# Patient Record
Sex: MALE | Race: OTHER | URBAN - METROPOLITAN AREA
[De-identification: names, ages, dates, MRNs, and addresses within clinical notes are randomized per-mention and may not be internally consistent; named-entity substitution may affect disease eponyms.]

---

## 2017-02-15 ENCOUNTER — OUTPATIENT (OUTPATIENT)
Dept: OUTPATIENT SERVICES | Facility: HOSPITAL | Age: 62
LOS: 1 days | Discharge: HOME | End: 2017-02-15

## 2017-06-27 DIAGNOSIS — E11.9 TYPE 2 DIABETES MELLITUS WITHOUT COMPLICATIONS: ICD-10-CM

## 2017-06-27 DIAGNOSIS — E78.00 PURE HYPERCHOLESTEROLEMIA, UNSPECIFIED: ICD-10-CM

## 2017-11-02 ENCOUNTER — OUTPATIENT (OUTPATIENT)
Dept: OUTPATIENT SERVICES | Facility: HOSPITAL | Age: 62
LOS: 1 days | Discharge: HOME | End: 2017-11-02

## 2017-11-02 DIAGNOSIS — E11.9 TYPE 2 DIABETES MELLITUS WITHOUT COMPLICATIONS: ICD-10-CM

## 2018-02-22 ENCOUNTER — OUTPATIENT (OUTPATIENT)
Dept: OUTPATIENT SERVICES | Facility: HOSPITAL | Age: 63
LOS: 1 days | Discharge: HOME | End: 2018-02-22

## 2018-02-23 DIAGNOSIS — M10.9 GOUT, UNSPECIFIED: ICD-10-CM

## 2018-02-23 DIAGNOSIS — E11.9 TYPE 2 DIABETES MELLITUS WITHOUT COMPLICATIONS: ICD-10-CM

## 2020-11-16 ENCOUNTER — APPOINTMENT (OUTPATIENT)
Dept: CARDIOLOGY | Facility: CLINIC | Age: 65
End: 2020-11-16
Payer: MEDICARE

## 2020-11-16 VITALS
HEIGHT: 73 IN | WEIGHT: 200 LBS | BODY MASS INDEX: 26.51 KG/M2 | DIASTOLIC BLOOD PRESSURE: 80 MMHG | SYSTOLIC BLOOD PRESSURE: 132 MMHG

## 2020-11-16 DIAGNOSIS — R06.02 SHORTNESS OF BREATH: ICD-10-CM

## 2020-11-16 DIAGNOSIS — Z78.9 OTHER SPECIFIED HEALTH STATUS: ICD-10-CM

## 2020-11-16 PROCEDURE — 93000 ELECTROCARDIOGRAM COMPLETE: CPT

## 2020-11-16 PROCEDURE — 99214 OFFICE O/P EST MOD 30 MIN: CPT

## 2020-11-16 RX ORDER — METOPROLOL TARTRATE 50 MG/1
50 TABLET, FILM COATED ORAL
Refills: 0 | Status: ACTIVE | COMMUNITY

## 2020-11-16 RX ORDER — ESCITALOPRAM OXALATE 10 MG/1
10 TABLET ORAL DAILY
Refills: 0 | Status: ACTIVE | COMMUNITY

## 2020-11-16 RX ORDER — NITROGLYCERIN 0.4 MG/1
0.4 TABLET SUBLINGUAL
Refills: 0 | Status: ACTIVE | COMMUNITY

## 2020-11-16 RX ORDER — ISOSORBIDE MONONITRATE 30 MG/1
30 TABLET, EXTENDED RELEASE ORAL DAILY
Refills: 0 | Status: ACTIVE | COMMUNITY

## 2020-11-16 RX ORDER — MELOXICAM 7.5 MG/1
7.5 TABLET ORAL DAILY
Refills: 0 | Status: ACTIVE | COMMUNITY

## 2020-11-16 RX ORDER — ALLOPURINOL 100 MG/1
100 TABLET ORAL DAILY
Refills: 0 | Status: ACTIVE | COMMUNITY

## 2020-11-16 RX ORDER — RANOLAZINE 500 MG/1
500 TABLET, EXTENDED RELEASE ORAL
Refills: 0 | Status: ACTIVE | COMMUNITY

## 2020-11-16 RX ORDER — TRAZODONE HYDROCHLORIDE 50 MG/1
50 TABLET ORAL
Refills: 0 | Status: ACTIVE | COMMUNITY

## 2020-11-16 NOTE — HISTORY OF PRESENT ILLNESS
[FreeTextEntry1] : Patient is a 65-yo male who had an AWMI in [facility] 10 years ago. [facility] was not available there at the time, medical therapy was given. Patient was told that he had a transmural scar and very weak heart. He did feel very SOB with minimal exertion after discharge from the hospital but gradually was able to increase his daily walking. He has generally been stable for almost 10 years, with only occasional episodes of chest tightness with emotional stress and unusual exertion, quickly relieved by SL nitroglycerin, improved since starting Ranexa. Exercise stress ECHO in 2014 showed apical scar, EF 45-50%, no ischemia.\par \par   Patient does c/o fatigue and ARRIAZA.

## 2020-11-16 NOTE — REVIEW OF SYSTEMS
[Blurry Vision] : no blurred vision [Seeing Double (Diplopia)] : no diplopia [see HPI] : see HPI [Lower Ext Edema] : no extremity edema [Leg Claudication] : no intermittent leg claudication [Palpitations] : no palpitations [Skin: A Rash] : no rash: [Anxiety] : no anxiety [Easy Bleeding] : no tendency for easy bleeding [Easy Bruising] : no tendency for easy bruising [Negative] : Endocrine

## 2020-11-16 NOTE — ASSESSMENT
[FreeTextEntry1] : CAD, s/p old ALMI. Mild LV dysfunction in 2016.\par HTN, DM, hyperlipidemia. \par ARRIAZA.\par \par Plan:\par Continue treatment.\par 2D ECHO.\par Patient encouraged to take daily walks.\par Repeat BW.\par \par \par Jesús Chilel MD\par

## 2020-11-16 NOTE — PHYSICAL EXAM
[General Appearance - Well Developed] : well developed [Normal Appearance] : normal appearance [Well Groomed] : well groomed [General Appearance - Well Nourished] : well nourished [No Deformities] : no deformities [General Appearance - In No Acute Distress] : no acute distress [Normal Conjunctiva] : the conjunctiva exhibited no abnormalities [Eyelids - No Xanthelasma] : the eyelids demonstrated no xanthelasmas [Heart Rate And Rhythm] : heart rate and rhythm were normal [Heart Sounds] : normal S1 and S2 [Murmurs] : no murmurs present [Arterial Pulses Normal] : the arterial pulses were normal [Edema] : no peripheral edema present [Respiration, Rhythm And Depth] : normal respiratory rhythm and effort [Exaggerated Use Of Accessory Muscles For Inspiration] : no accessory muscle use [Auscultation Breath Sounds / Voice Sounds] : lungs were clear to auscultation bilaterally [Bowel Sounds] : normal bowel sounds [Abdomen Soft] : soft [Abdomen Tenderness] : non-tender [Abdomen Mass (___ Cm)] : no abdominal mass palpated [Cyanosis, Localized] : no localized cyanosis [Skin Color & Pigmentation] : normal skin color and pigmentation [] : no rash [Oriented To Time, Place, And Person] : oriented to person, place, and time

## 2020-12-12 ENCOUNTER — APPOINTMENT (OUTPATIENT)
Dept: CARDIOLOGY | Facility: CLINIC | Age: 65
End: 2020-12-12
Payer: MEDICARE

## 2020-12-12 PROCEDURE — 93306 TTE W/DOPPLER COMPLETE: CPT

## 2021-01-07 ENCOUNTER — APPOINTMENT (OUTPATIENT)
Dept: CARDIOLOGY | Facility: CLINIC | Age: 66
End: 2021-01-07
Payer: MEDICARE

## 2021-01-07 VITALS
SYSTOLIC BLOOD PRESSURE: 126 MMHG | WEIGHT: 202 LBS | DIASTOLIC BLOOD PRESSURE: 88 MMHG | HEIGHT: 71 IN | BODY MASS INDEX: 28.28 KG/M2

## 2021-01-07 PROCEDURE — 99213 OFFICE O/P EST LOW 20 MIN: CPT

## 2021-01-07 PROCEDURE — 93000 ELECTROCARDIOGRAM COMPLETE: CPT

## 2021-01-07 NOTE — ASSESSMENT
[FreeTextEntry1] : CAD, s/p AWMI (no intervention performed).\par Apical scar, LVEF 49%.\par Good functional capacity. Patient is not interested in ischemia w/u unless his symptoms change.\par Hyperlipidemia still uncontrolled.\par \par Plan:\par Continue Atorvastatin, add Fenofibrate.\par Continue daily walking.\par Repeat BW prior to next visit.\par F/u in 6 months.\par \par Jesús Chilel MD\par \par \par

## 2021-01-07 NOTE — HISTORY OF PRESENT ILLNESS
[FreeTextEntry1] : Patient is a 65-yo male who had an AWMI in [facility] > 10 years ago. [facility] was not available there at the time, medical therapy was given. Patient was told that he had a transmural scar and very weak heart. He did feel very SOB with minimal exertion after discharge from the hospital but gradually was able to increase his daily walking. He has generally been stable for > 10 years, with only occasional episodes of chest tightness with emotional stress and unusual exertion, quickly relieved by SL nitroglycerin, improved since starting Ranexa. Exercise stress ECHO in 2014 showed apical scar, EF 45-50%, no ischemia.\par \par   Patient does c/o fatigue, denies SOB or any recent episodes of chest pain.\par \par 2D ECHO 12/12/20:\par Apical WMAs, LVEF 49%\par Mild MR, TR\par Aortic root 3.9 cm.\par \par GFR 55\par Hb A1c 6.7\par LDL 99\par HDL 39\par Triglycerides 201\par TSH 0.94.\par

## 2021-01-07 NOTE — PHYSICAL EXAM
[General Appearance - Well Developed] : well developed [Normal Appearance] : normal appearance [Well Groomed] : well groomed [General Appearance - Well Nourished] : well nourished [No Deformities] : no deformities [General Appearance - In No Acute Distress] : no acute distress [Normal Conjunctiva] : the conjunctiva exhibited no abnormalities [Eyelids - No Xanthelasma] : the eyelids demonstrated no xanthelasmas [Respiration, Rhythm And Depth] : normal respiratory rhythm and effort [Exaggerated Use Of Accessory Muscles For Inspiration] : no accessory muscle use [Auscultation Breath Sounds / Voice Sounds] : lungs were clear to auscultation bilaterally [Heart Rate And Rhythm] : heart rate and rhythm were normal [Heart Sounds] : normal S1 and S2 [Murmurs] : no murmurs present [Arterial Pulses Normal] : the arterial pulses were normal [Edema] : no peripheral edema present [Bowel Sounds] : normal bowel sounds [Abdomen Soft] : soft [Abdomen Tenderness] : non-tender [Abdomen Mass (___ Cm)] : no abdominal mass palpated [Cyanosis, Localized] : no localized cyanosis [Skin Color & Pigmentation] : normal skin color and pigmentation [] : no rash [Oriented To Time, Place, And Person] : oriented to person, place, and time

## 2021-05-07 ENCOUNTER — RESULT CHARGE (OUTPATIENT)
Age: 66
End: 2021-05-07

## 2021-05-07 ENCOUNTER — APPOINTMENT (OUTPATIENT)
Dept: CARDIOLOGY | Facility: CLINIC | Age: 66
End: 2021-05-07
Payer: MEDICARE

## 2021-05-07 VITALS
HEART RATE: 68 BPM | BODY MASS INDEX: 28.84 KG/M2 | WEIGHT: 206 LBS | SYSTOLIC BLOOD PRESSURE: 135 MMHG | HEIGHT: 71 IN | DIASTOLIC BLOOD PRESSURE: 85 MMHG | TEMPERATURE: 97.8 F

## 2021-05-07 PROCEDURE — 93000 ELECTROCARDIOGRAM COMPLETE: CPT

## 2021-05-07 PROCEDURE — 99214 OFFICE O/P EST MOD 30 MIN: CPT

## 2021-05-10 RX ORDER — OLMESARTAN MEDOXOMIL AND HYDROCHLOROTHIAZIDE 20; 12.5 MG/1; MG/1
20-12.5 TABLET ORAL DAILY
Refills: 0 | Status: DISCONTINUED | COMMUNITY
End: 2021-05-10

## 2021-05-10 NOTE — REVIEW OF SYSTEMS
[Lower Ext Edema] : no extremity edema [Leg Claudication] : no intermittent leg claudication [Palpitations] : no palpitations [Orthopnea] : no orthopnea [Syncope] : no syncope [Rash] : no rash [Anxiety] : no anxiety [Easy Bleeding] : no tendency for easy bleeding [Easy Bruising] : no tendency for easy bruising [Negative] : Neurological

## 2021-05-10 NOTE — HISTORY OF PRESENT ILLNESS
[FreeTextEntry1] : Patient is a 65-yo male who had an AWMI in [facility] > 10 years ago. [facility] was not available there at the time, medical therapy was given. Patient was told that he had a transmural scar and very weak heart. He did feel very SOB with minimal exertion after discharge from the hospital but gradually was able to increase his daily walking. He has generally been stable for > 10 years, with only occasional episodes of chest tightness with emotional stress and unusual exertion, quickly relieved by SL nitroglycerin, improved since starting Ranexa. Exercise stress ECHO in 2014 showed apical scar, EF 45-50%, no ischemia.\par \par   Patient does c/o fatigue, denies SOB or any recent episodes of chest pain. His BP has been elevated.\par \par 2D ECHO 12/12/20:\par Apical WMAs, LVEF 49%\par Mild MR, TR\par Aortic root 3.9 cm.\par \par \par

## 2021-05-10 NOTE — ASSESSMENT
[FreeTextEntry1] : CAD, s/p AWMI (no intervention performed).\par Apical scar, LVEF 49%.\par Good functional capacity. Patient is not interested in [facility] unless his symptoms change.\par Hyperlipidemia.\par HTN uncontrolled.\par \par Plan:\par Continue Atorvastatin, Fenofibrate.Blood work pending.\par Continue Losartan 100 mg, add HCTZ 12.5 mg.\par Continue daily walking.\par F/u in 4 months.\par \par Jesús Chilel MD\par \par \par

## 2021-07-11 ENCOUNTER — RX RENEWAL (OUTPATIENT)
Age: 66
End: 2021-07-11

## 2021-09-17 ENCOUNTER — APPOINTMENT (OUTPATIENT)
Dept: CARDIOLOGY | Facility: CLINIC | Age: 66
End: 2021-09-17
Payer: MEDICARE

## 2021-09-17 VITALS
SYSTOLIC BLOOD PRESSURE: 120 MMHG | HEIGHT: 71 IN | WEIGHT: 203 LBS | DIASTOLIC BLOOD PRESSURE: 80 MMHG | BODY MASS INDEX: 28.42 KG/M2

## 2021-09-17 PROCEDURE — 99213 OFFICE O/P EST LOW 20 MIN: CPT

## 2021-09-17 PROCEDURE — 93000 ELECTROCARDIOGRAM COMPLETE: CPT

## 2021-09-19 RX ORDER — GENTAMICIN SULFATE 1 MG/G
0.1 OINTMENT TOPICAL
Qty: 30 | Refills: 0 | Status: DISCONTINUED | COMMUNITY
Start: 2021-09-12

## 2021-09-19 RX ORDER — BLOOD SUGAR DIAGNOSTIC
STRIP MISCELLANEOUS
Qty: 100 | Refills: 0 | Status: DISCONTINUED | COMMUNITY
Start: 2021-08-03

## 2021-09-19 RX ORDER — OLMESARTAN MEDOXOMIL 20 MG/1
20 TABLET, FILM COATED ORAL
Qty: 30 | Refills: 0 | Status: DISCONTINUED | COMMUNITY
Start: 2021-04-13

## 2021-09-19 RX ORDER — BUSPIRONE HYDROCHLORIDE 15 MG/1
15 TABLET ORAL
Qty: 60 | Refills: 0 | Status: ACTIVE | COMMUNITY
Start: 2021-05-18

## 2021-09-19 RX ORDER — LIRAGLUTIDE 6 MG/ML
18 INJECTION SUBCUTANEOUS
Qty: 9 | Refills: 0 | Status: DISCONTINUED | COMMUNITY
Start: 2021-07-06

## 2021-09-19 RX ORDER — FAMOTIDINE 40 MG/1
40 TABLET, FILM COATED ORAL
Qty: 30 | Refills: 0 | Status: ACTIVE | COMMUNITY
Start: 2021-08-03

## 2021-09-19 RX ORDER — PEN NEEDLE, DIABETIC 32GX 5/32"
70 NEEDLE, DISPOSABLE MISCELLANEOUS
Qty: 100 | Refills: 0 | Status: DISCONTINUED | COMMUNITY
Start: 2021-08-03

## 2021-09-19 NOTE — ASSESSMENT
[FreeTextEntry1] : CAD, s/p AWMI (no intervention performed).\par Apical scar, LVEF 49%.\par Good functional capacity. Patient is not interested in [facility] unless his symptoms change.\par Hyperlipidemia.\par HTN.\par \par Plan:\par Continue treatment.\par Continue daily walking.\par Repeat fasting blood work.\par F/u in 6 months.\par \par Jesús Chilel MD\par \par \par

## 2021-09-19 NOTE — HISTORY OF PRESENT ILLNESS
[FreeTextEntry1] : Patient is a 65-yo male who had an AWMI in [facility] > 10 years ago. [facility] was not available there at the time, medical therapy was given. Patient was told that he had a transmural scar and very weak heart. He did feel very SOB with minimal exertion after discharge from the hospital but gradually was able to increase his daily walking. He has generally been stable for > 10 years, with only occasional episodes of chest tightness with emotional stress and unusual exertion, quickly relieved by SL nitroglycerin, improved since starting Ranexa. Exercise stress ECHO in 2014 showed apical scar, EF 45-50%, no ischemia.\par \par   Patient denies SOB or any recent episodes of chest pain. His BP has been well controlled with 1/2 tablet of Losartan HCT.\par \par 2D ECHO 12/12/20:\par Apical WMAs, LVEF 49%\par Mild MR, TR\par Aortic root 3.9 cm.\par \par LDL 89\par GFR 51.

## 2021-09-20 ENCOUNTER — RESULT CHARGE (OUTPATIENT)
Age: 66
End: 2021-09-20

## 2022-01-07 ENCOUNTER — RX RENEWAL (OUTPATIENT)
Age: 67
End: 2022-01-07

## 2022-03-04 ENCOUNTER — RESULT CHARGE (OUTPATIENT)
Age: 67
End: 2022-03-04

## 2022-03-04 ENCOUNTER — APPOINTMENT (OUTPATIENT)
Dept: CARDIOLOGY | Facility: CLINIC | Age: 67
End: 2022-03-04
Payer: MEDICARE

## 2022-03-04 VITALS
HEART RATE: 65 BPM | DIASTOLIC BLOOD PRESSURE: 76 MMHG | WEIGHT: 199 LBS | HEIGHT: 71 IN | SYSTOLIC BLOOD PRESSURE: 122 MMHG | BODY MASS INDEX: 27.86 KG/M2

## 2022-03-04 PROCEDURE — 99213 OFFICE O/P EST LOW 20 MIN: CPT

## 2022-03-04 PROCEDURE — 93000 ELECTROCARDIOGRAM COMPLETE: CPT

## 2022-03-04 RX ORDER — EMPAGLIFLOZIN 10 MG/1
10 TABLET, FILM COATED ORAL DAILY
Refills: 0 | Status: ACTIVE | COMMUNITY

## 2022-03-04 RX ORDER — METFORMIN HYDROCHLORIDE 500 MG/1
500 TABLET, FILM COATED, EXTENDED RELEASE ORAL
Qty: 60 | Refills: 0 | Status: DISCONTINUED | COMMUNITY
Start: 2021-07-06 | End: 2022-03-04

## 2022-03-04 RX ORDER — FLUTICASONE PROPIONATE 50 UG/1
50 SPRAY, METERED NASAL DAILY
Qty: 2 | Refills: 3 | Status: ACTIVE | COMMUNITY

## 2022-03-04 RX ORDER — DICLOFENAC EPOLAMINE 0.01 G/1
1.3 SYSTEM TOPICAL ONCE
Refills: 0 | Status: ACTIVE | COMMUNITY

## 2022-03-04 NOTE — PHYSICAL EXAM
[Well Developed] : well developed [Well Nourished] : well nourished [No Acute Distress] : no acute distress [Normal Conjunctiva] : normal conjunctiva [No Carotid Bruit] : no carotid bruit [Normal Venous Pressure] : normal venous pressure [Normal S1, S2] : normal S1, S2 [No Murmur] : no murmur [No Rub] : no rub [S4] : S4 [Clear Lung Fields] : clear lung fields [Good Air Entry] : good air entry [No Respiratory Distress] : no respiratory distress  [Soft] : abdomen soft [Non Tender] : non-tender [Normal Bowel Sounds] : normal bowel sounds [Normal Gait] : normal gait [No Edema] : no edema [No Cyanosis] : no cyanosis [No Clubbing] : no clubbing [No Varicosities] : no varicosities [No Rash] : no rash [Moves all extremities] : moves all extremities [No Focal Deficits] : no focal deficits [Normal Speech] : normal speech [Normal memory] : normal memory [Alert and Oriented] : alert and oriented

## 2022-03-05 RX ORDER — LOSARTAN POTASSIUM AND HYDROCHLOROTHIAZIDE 12.5; 1 MG/1; MG/1
100-12.5 TABLET ORAL DAILY
Qty: 90 | Refills: 1 | Status: DISCONTINUED | COMMUNITY
Start: 2021-05-07 | End: 2022-03-05

## 2022-03-05 NOTE — REVIEW OF SYSTEMS
[Negative] : Neurological [Lower Ext Edema] : no extremity edema [Leg Claudication] : no intermittent leg claudication [Orthopnea] : no orthopnea [Palpitations] : no palpitations [Syncope] : no syncope [Rash] : no rash [Anxiety] : no anxiety [Easy Bruising] : no tendency for easy bruising [Easy Bleeding] : no tendency for easy bleeding

## 2022-03-05 NOTE — HISTORY OF PRESENT ILLNESS
[FreeTextEntry1] : Patient is a 65-yo male who had an AWMI in [facility] > 10 years ago. [facility] was not available there at the time, medical therapy was given. Patient was told that he had a transmural scar and very weak heart. He did feel very SOB with minimal exertion after discharge from the hospital but gradually was able to increase his daily walking. He has generally been stable for > 10 years, with only occasional episodes of chest tightness with emotional stress and unusual exertion, quickly relieved by SL nitroglycerin, improved since starting Ranexa. Exercise stress ECHO in 2014 showed apical scar, EF 45-50%, no ischemia.\par \par   Patient denies SOB or any recent episodes of chest pain. His BP has been well controlled with 1/2 tablet of Losartan HCT.\par \par 2D ECHO 12/12/20:\par Apical WMAs, LVEF 49%\par Mild MR, TR\par Aortic root 3.9 cm.\par \par GFR 43\par HbA1c 7.0.

## 2022-03-05 NOTE — ASSESSMENT
[FreeTextEntry1] : CAD, s/p AWMI (no intervention performed).\par Apical scar, LVEF 49%.\par Good functional capacity. Patient is not interested in [facility] unless his symptoms change.\par Hyperlipidemia.\par CKD 3, HCTZ stopped.\par HTN still controlled.\par \par Plan:\par Continue treatment.\par Continue daily walking.\par Repeat fasting blood work with lipid profile. \par 2D ECHO.\par F/u in 6 months.\par \par Jesús Chilel MD\par \par \par

## 2022-05-04 ENCOUNTER — APPOINTMENT (OUTPATIENT)
Dept: CARDIOLOGY | Facility: CLINIC | Age: 67
End: 2022-05-04

## 2022-05-04 PROCEDURE — 93306 TTE W/DOPPLER COMPLETE: CPT

## 2022-06-03 ENCOUNTER — OUTPATIENT (OUTPATIENT)
Dept: OUTPATIENT SERVICES | Facility: HOSPITAL | Age: 67
LOS: 1 days | Discharge: HOME | End: 2022-06-03

## 2022-08-04 ENCOUNTER — OUTPATIENT (OUTPATIENT)
Dept: OUTPATIENT SERVICES | Facility: HOSPITAL | Age: 67
LOS: 1 days | Discharge: HOME | End: 2022-08-04

## 2022-09-06 ENCOUNTER — RESULT CHARGE (OUTPATIENT)
Age: 67
End: 2022-09-06

## 2022-09-06 ENCOUNTER — APPOINTMENT (OUTPATIENT)
Dept: CARDIOLOGY | Facility: CLINIC | Age: 67
End: 2022-09-06

## 2022-09-06 VITALS
SYSTOLIC BLOOD PRESSURE: 124 MMHG | HEIGHT: 71 IN | DIASTOLIC BLOOD PRESSURE: 84 MMHG | BODY MASS INDEX: 27.02 KG/M2 | WEIGHT: 193 LBS | HEART RATE: 71 BPM

## 2022-09-06 PROCEDURE — 99213 OFFICE O/P EST LOW 20 MIN: CPT

## 2022-09-06 PROCEDURE — 93000 ELECTROCARDIOGRAM COMPLETE: CPT

## 2022-09-06 RX ORDER — BLOOD-GLUCOSE METER
32G X 4 MM EACH MISCELLANEOUS
Qty: 100 | Refills: 0 | Status: DISCONTINUED | COMMUNITY
Start: 2022-04-11

## 2022-09-06 RX ORDER — METFORMIN HYDROCHLORIDE 500 MG/1
500 TABLET, COATED ORAL DAILY
Refills: 0 | Status: DISCONTINUED | COMMUNITY
End: 2022-09-06

## 2022-09-06 RX ORDER — IBUPROFEN 600 MG/1
600 TABLET, FILM COATED ORAL
Qty: 10 | Refills: 0 | Status: DISCONTINUED | COMMUNITY
Start: 2022-08-27

## 2022-09-06 RX ORDER — LOSARTAN POTASSIUM 100 MG/1
100 TABLET, FILM COATED ORAL
Qty: 90 | Refills: 1 | Status: DISCONTINUED | COMMUNITY
Start: 2022-03-05 | End: 2022-09-06

## 2022-09-06 RX ORDER — CHLORHEXIDINE GLUCONATE, 0.12% ORAL RINSE 1.2 MG/ML
0.12 SOLUTION DENTAL
Qty: 473 | Refills: 0 | Status: DISCONTINUED | COMMUNITY
Start: 2022-08-27

## 2022-09-06 RX ORDER — AMOXICILLIN 500 MG/1
500 CAPSULE ORAL
Qty: 21 | Refills: 0 | Status: DISCONTINUED | COMMUNITY
Start: 2022-08-27

## 2022-09-06 NOTE — ASSESSMENT
[FreeTextEntry1] : CAD, s/p AWMI (no intervention performed).\par Apical scar, LVEF 56%.\par Good functional capacity. Patient is not interested in [facility] unless his symptoms change.\par Hyperlipidemia.\par CKD 3.\par HTN still controlled.\par \par Plan:\par Continue treatment.\par Continue daily walking.\par Repeat fasting blood work with lipid profile. \par F/u in 6 months.\par \par Jesús Chilel MD\par \par \par  no

## 2022-09-06 NOTE — HISTORY OF PRESENT ILLNESS
[FreeTextEntry1] : Patient is a 65-yo male who had an AWMI in [facility] > 10 years ago. [facility] was not available there at the time, medical therapy was given. Patient was told that he had a transmural scar and very weak heart. He did feel very SOB with minimal exertion after discharge from the hospital but gradually was able to increase his daily walking. He has generally been stable for > 10 years, with only occasional episodes of chest tightness with emotional stress and unusual exertion, quickly relieved by SL nitroglycerin, improved since starting Ranexa. Exercise stress ECHO in 2014 showed apical scar, EF 45-50%, no ischemia.\par \par   Patient denies SOB or any recent episodes of chest pain. His BP has been well controlled with 1/2 tablet of Losartan HCT.\par \par 2D ECHO 05/04/22:\par Apical WMAs, LVEF 56%, G1DD\par Mild MR\par Aortic root 4.0 cm.\par \par GFR 41\par HbA1c 6.8.

## 2023-03-14 ENCOUNTER — APPOINTMENT (OUTPATIENT)
Dept: CARDIOLOGY | Facility: CLINIC | Age: 68
End: 2023-03-14
Payer: MEDICARE

## 2023-03-14 ENCOUNTER — RESULT CHARGE (OUTPATIENT)
Age: 68
End: 2023-03-14

## 2023-03-14 VITALS
TEMPERATURE: 97.8 F | RESPIRATION RATE: 16 BRPM | HEART RATE: 73 BPM | BODY MASS INDEX: 27.44 KG/M2 | DIASTOLIC BLOOD PRESSURE: 82 MMHG | SYSTOLIC BLOOD PRESSURE: 112 MMHG | WEIGHT: 196 LBS | HEIGHT: 71 IN

## 2023-03-14 PROCEDURE — 93000 ELECTROCARDIOGRAM COMPLETE: CPT

## 2023-03-14 PROCEDURE — 99214 OFFICE O/P EST MOD 30 MIN: CPT

## 2023-03-14 RX ORDER — LIRAGLUTIDE 6 MG/ML
18 INJECTION SUBCUTANEOUS DAILY
Refills: 0 | Status: ACTIVE | COMMUNITY

## 2023-03-14 NOTE — HISTORY OF PRESENT ILLNESS
[FreeTextEntry1] : Patient is a 67-yo male who had an AWMI in [facility] > 10 years ago. [facility] was not available there at the time, medical therapy was given. Patient was told that he had a transmural scar and very weak heart. He did feel very SOB with minimal exertion after discharge from the hospital but gradually was able to increase his daily walking. He has generally been stable for > 10 years, with only occasional episodes of chest tightness with emotional stress and unusual exertion, quickly relieved by SL nitroglycerin, improved since starting Ranexa. Exercise stress ECHO in 2014 showed apical scar, EF 45-50%, no ischemia.\par \par   Patient presents for a follow up visit. He reports feeling better, he denies chest pain, has ARRIAZA when he walks fast but this is not new. \par \par  (68)\par Trig 577 (309)\par GFR 51\par HbA1c 7.5\par \par Stopped Fenofibrate for 3 months as per PCP due to CKD, now restarted it.

## 2023-03-14 NOTE — CARDIOLOGY SUMMARY
[de-identified] : 3/14/23:\par SR, poor R wave progression. [de-identified] : 2D ECHO 05/04/22:\par Apical WMAs, LVEF 56%, G1DD\par Mild MR\par Aortic root 4.0 cm.

## 2023-03-14 NOTE — ASSESSMENT
[FreeTextEntry1] : CAD, s/p AWMI (no intervention performed).\par Apical scar, LVEF 56%.\par Good functional capacity. Patient is not interested in [facility] unless his symptoms change.\par Hyperlipidemia uncontrolled.\par CKD 3.\par HTN still controlled.\par \par Plan:\par Continue treatment. Importance of compliance discussed.\par Add Vascepa.\par Continue daily walking.\par Repeat fasting blood work.\par F/u in 6 months.\par \par Jesús Chilel MD\par \par \par

## 2023-07-05 NOTE — CARDIOLOGY SUMMARY
Problem: Adult Inpatient Plan of Care  Goal: Plan of Care Review  Outcome: Ongoing, Progressing  Goal: Patient-Specific Goal (Individualized)  Outcome: Ongoing, Progressing  Goal: Absence of Hospital-Acquired Illness or Injury  Outcome: Ongoing, Progressing  Goal: Optimal Comfort and Wellbeing  Outcome: Ongoing, Progressing  Goal: Readiness for Transition of Care  Outcome: Ongoing, Progressing     Problem: Skin Injury Risk Increased  Goal: Skin Health and Integrity  Outcome: Ongoing, Progressing     Problem: Impaired Wound Healing  Goal: Optimal Wound Healing  Outcome: Ongoing, Progressing      [de-identified] : 05/07/21:\par SR, old AWMI.

## 2023-07-14 ENCOUNTER — OUTPATIENT (OUTPATIENT)
Dept: OUTPATIENT SERVICES | Facility: HOSPITAL | Age: 68
LOS: 1 days | End: 2023-07-14
Payer: COMMERCIAL

## 2023-07-14 DIAGNOSIS — K02.52 DENTAL CARIES ON PIT AND FISSURE SURFACE PENETRATING INTO DENTIN: ICD-10-CM

## 2023-07-14 PROCEDURE — D0220: CPT

## 2023-07-21 ENCOUNTER — OUTPATIENT (OUTPATIENT)
Dept: OUTPATIENT SERVICES | Facility: HOSPITAL | Age: 68
LOS: 1 days | End: 2023-07-21

## 2023-07-21 DIAGNOSIS — K02.52 DENTAL CARIES ON PIT AND FISSURE SURFACE PENETRATING INTO DENTIN: ICD-10-CM

## 2023-07-21 PROCEDURE — D2331: CPT

## 2023-07-21 PROCEDURE — D0170: CPT

## 2023-07-24 ENCOUNTER — OUTPATIENT (OUTPATIENT)
Dept: OUTPATIENT SERVICES | Facility: HOSPITAL | Age: 68
LOS: 1 days | End: 2023-07-24
Payer: COMMERCIAL

## 2023-07-24 DIAGNOSIS — K02.9 DENTAL CARIES, UNSPECIFIED: ICD-10-CM

## 2023-07-24 PROCEDURE — D0140: CPT

## 2023-07-27 DIAGNOSIS — K02.9 DENTAL CARIES, UNSPECIFIED: ICD-10-CM

## 2023-07-28 ENCOUNTER — OUTPATIENT (OUTPATIENT)
Dept: OUTPATIENT SERVICES | Facility: HOSPITAL | Age: 68
LOS: 1 days | End: 2023-07-28
Payer: COMMERCIAL

## 2023-07-28 DIAGNOSIS — K02.52 DENTAL CARIES ON PIT AND FISSURE SURFACE PENETRATING INTO DENTIN: ICD-10-CM

## 2023-07-28 PROCEDURE — D0220: CPT

## 2023-07-28 PROCEDURE — D0170: CPT

## 2023-07-31 DIAGNOSIS — K02.9 DENTAL CARIES, UNSPECIFIED: ICD-10-CM

## 2023-08-01 DIAGNOSIS — K08.539 FRACTURED DENTAL RESTORATIVE MATERIAL, UNSPECIFIED: ICD-10-CM

## 2023-08-02 DIAGNOSIS — K08.539 FRACTURED DENTAL RESTORATIVE MATERIAL, UNSPECIFIED: ICD-10-CM

## 2023-09-08 LAB
ALBUMIN SERPL ELPH-MCNC: 4.7 G/DL
ALP BLD-CCNC: 32 U/L
ALT SERPL-CCNC: 30 U/L
ANION GAP SERPL CALC-SCNC: 13 MMOL/L
AST SERPL-CCNC: 25 U/L
BILIRUB SERPL-MCNC: 1 MG/DL
BUN SERPL-MCNC: 23 MG/DL
CALCIUM SERPL-MCNC: 9.9 MG/DL
CHLORIDE SERPL-SCNC: 100 MMOL/L
CHOLEST SERPL-MCNC: 193 MG/DL
CO2 SERPL-SCNC: 23 MMOL/L
CREAT SERPL-MCNC: 1.7 MG/DL
EGFR: 44 ML/MIN/1.73M2
ESTIMATED AVERAGE GLUCOSE: 183 MG/DL
GLUCOSE SERPL-MCNC: 137 MG/DL
HBA1C MFR BLD HPLC: 8 %
HDLC SERPL-MCNC: 40 MG/DL
LDLC SERPL CALC-MCNC: 98 MG/DL
NONHDLC SERPL-MCNC: 153 MG/DL
POTASSIUM SERPL-SCNC: 4.7 MMOL/L
PROT SERPL-MCNC: 7.4 G/DL
SODIUM SERPL-SCNC: 136 MMOL/L
TRIGL SERPL-MCNC: 274 MG/DL
TSH SERPL-ACNC: 1.84 UIU/ML

## 2023-09-12 ENCOUNTER — APPOINTMENT (OUTPATIENT)
Dept: CARDIOLOGY | Facility: CLINIC | Age: 68
End: 2023-09-12
Payer: MEDICARE

## 2023-09-12 VITALS
HEART RATE: 78 BPM | DIASTOLIC BLOOD PRESSURE: 78 MMHG | WEIGHT: 194 LBS | BODY MASS INDEX: 27.16 KG/M2 | SYSTOLIC BLOOD PRESSURE: 110 MMHG | HEIGHT: 71 IN

## 2023-09-12 PROCEDURE — 93000 ELECTROCARDIOGRAM COMPLETE: CPT

## 2023-09-12 PROCEDURE — 99214 OFFICE O/P EST MOD 30 MIN: CPT

## 2023-09-12 RX ORDER — FENOFIBRATE 145 MG/1
145 TABLET, COATED ORAL DAILY
Qty: 90 | Refills: 1 | Status: ACTIVE | COMMUNITY
Start: 2021-01-07 | End: 1900-01-01

## 2023-09-12 RX ORDER — ATORVASTATIN CALCIUM 40 MG/1
40 TABLET, FILM COATED ORAL
Refills: 0 | Status: DISCONTINUED | COMMUNITY
End: 2023-09-12

## 2024-02-29 ENCOUNTER — RX RENEWAL (OUTPATIENT)
Age: 69
End: 2024-02-29

## 2024-02-29 RX ORDER — ICOSAPENT ETHYL 1 G/1
1 CAPSULE ORAL
Qty: 360 | Refills: 1 | Status: ACTIVE | COMMUNITY
Start: 2023-03-14 | End: 1900-01-01

## 2024-03-11 LAB
ALBUMIN SERPL ELPH-MCNC: 4.4 G/DL
ALP BLD-CCNC: 35 U/L
ALT SERPL-CCNC: 15 U/L
ANION GAP SERPL CALC-SCNC: 13 MMOL/L
AST SERPL-CCNC: 16 U/L
BILIRUB SERPL-MCNC: 0.5 MG/DL
BUN SERPL-MCNC: 24 MG/DL
CALCIUM SERPL-MCNC: 9.5 MG/DL
CHLORIDE SERPL-SCNC: 104 MMOL/L
CHOLEST SERPL-MCNC: 177 MG/DL
CO2 SERPL-SCNC: 24 MMOL/L
CREAT SERPL-MCNC: 1.8 MG/DL
EGFR: 40 ML/MIN/1.73M2
ESTIMATED AVERAGE GLUCOSE: 166 MG/DL
GLUCOSE SERPL-MCNC: 146 MG/DL
HBA1C MFR BLD HPLC: 7.4 %
HCT VFR BLD CALC: 44.1 %
HDLC SERPL-MCNC: 41 MG/DL
HGB BLD-MCNC: 13.7 G/DL
LDLC SERPL CALC-MCNC: 100 MG/DL
MCHC RBC-ENTMCNC: 28.9 PG
MCHC RBC-ENTMCNC: 31.1 G/DL
MCV RBC AUTO: 93 FL
NONHDLC SERPL-MCNC: 136 MG/DL
PLATELET # BLD AUTO: 207 K/UL
PMV BLD AUTO: 0 /100 WBCS
PMV BLD: 10.6 FL
POTASSIUM SERPL-SCNC: 5 MMOL/L
PROT SERPL-MCNC: 7.5 G/DL
RBC # BLD: 4.74 M/UL
RBC # FLD: 13.7 %
SODIUM SERPL-SCNC: 141 MMOL/L
TRIGL SERPL-MCNC: 179 MG/DL
TSH SERPL-ACNC: 1.29 UIU/ML
WBC # FLD AUTO: 6.16 K/UL

## 2024-03-14 ENCOUNTER — APPOINTMENT (OUTPATIENT)
Dept: CARDIOLOGY | Facility: CLINIC | Age: 69
End: 2024-03-14
Payer: MEDICARE

## 2024-03-14 VITALS
WEIGHT: 188 LBS | DIASTOLIC BLOOD PRESSURE: 80 MMHG | HEIGHT: 71 IN | SYSTOLIC BLOOD PRESSURE: 108 MMHG | BODY MASS INDEX: 26.32 KG/M2 | HEART RATE: 87 BPM

## 2024-03-14 DIAGNOSIS — I25.2 OLD MYOCARDIAL INFARCTION: ICD-10-CM

## 2024-03-14 DIAGNOSIS — I25.9 CHRONIC ISCHEMIC HEART DISEASE, UNSPECIFIED: ICD-10-CM

## 2024-03-14 DIAGNOSIS — N18.30 CHRONIC KIDNEY DISEASE, STAGE 3 UNSPECIFIED: ICD-10-CM

## 2024-03-14 DIAGNOSIS — Z00.00 ENCOUNTER FOR GENERAL ADULT MEDICAL EXAMINATION W/OUT ABNORMAL FINDINGS: ICD-10-CM

## 2024-03-14 DIAGNOSIS — I10 ESSENTIAL (PRIMARY) HYPERTENSION: ICD-10-CM

## 2024-03-14 DIAGNOSIS — E78.5 HYPERLIPIDEMIA, UNSPECIFIED: ICD-10-CM

## 2024-03-14 PROCEDURE — 99214 OFFICE O/P EST MOD 30 MIN: CPT

## 2024-03-14 PROCEDURE — 93000 ELECTROCARDIOGRAM COMPLETE: CPT

## 2024-03-14 RX ORDER — ROSUVASTATIN CALCIUM 20 MG/1
20 TABLET, FILM COATED ORAL
Qty: 90 | Refills: 1 | Status: ACTIVE | COMMUNITY
Start: 2023-09-12 | End: 1900-01-01

## 2024-03-14 RX ORDER — PRASUGREL 5 MG/1
5 TABLET, FILM COATED ORAL
Qty: 90 | Refills: 0 | Status: DISCONTINUED | COMMUNITY
Start: 2021-07-06 | End: 2024-03-14

## 2024-03-14 NOTE — ASSESSMENT
[FreeTextEntry1] : 67 y/o M CAD, s/p AWMI (no intervention performed). Apical scar, LVEF 56%. Good functional capacity. Patient is not interested in [facility] unless his symptoms change. Hyperlipidemia uncontrolled. CKD 3. HTN still controlled.  Plan: Continue Rosuvastatin 20 mg daily. Patient is not willing to increase the dose. Continue daily walking. Repeat fasting blood work. F/u in 6 months.  Jesús Chilel MD

## 2024-03-14 NOTE — HISTORY OF PRESENT ILLNESS
[FreeTextEntry1] : 68-yo male who had an AWMI in [facility] > 10 years ago. [facility] was not available there at the time, medical therapy was given. Patient was told that he had a transmural scar and very weak heart. He did feel very SOB with minimal exertion after discharge from the hospital but gradually was able to increase his daily walking. He has generally been stable for > 10 years, with only occasional episodes of chest tightness with emotional stress and unusual exertion, quickly relieved by SL nitroglycerin, improved since starting Ranexa. Exercise stress ECHO in 2014 showed apical scar, EF 45-50%, no ischemia.  Patient presents for a follow up visit. He reports feeling well. He still has occasional episodes of chest pressure in the mornings when he wakes up. Has b/l leg cramps when he lies down.     A1C 7.4  GFR 40.

## 2024-03-14 NOTE — CARDIOLOGY SUMMARY
[de-identified] : 2D ECHO 05/04/22:\par  Apical WMAs, LVEF 56%, G1DD\par  Mild MR\par  Aortic root 4.0 cm. [de-identified] : 03/14/24: SR, ? old IWMI.

## 2024-03-14 NOTE — REVIEW OF SYSTEMS
[Negative] : Neurological [Lower Ext Edema] : no extremity edema [Leg Claudication] : no intermittent leg claudication [Palpitations] : no palpitations [Syncope] : no syncope [Orthopnea] : no orthopnea [Rash] : no rash [Anxiety] : no anxiety [Easy Bleeding] : no tendency for easy bleeding [Easy Bruising] : no tendency for easy bruising

## 2024-03-14 NOTE — PHYSICAL EXAM
[Well Developed] : well developed [Well Nourished] : well nourished [No Acute Distress] : no acute distress [Normal Conjunctiva] : normal conjunctiva [Normal Venous Pressure] : normal venous pressure [No Carotid Bruit] : no carotid bruit [Normal S1, S2] : normal S1, S2 [No Murmur] : no murmur [No Rub] : no rub [S4] : S4 [Clear Lung Fields] : clear lung fields [No Respiratory Distress] : no respiratory distress  [Good Air Entry] : good air entry [Soft] : abdomen soft [Non Tender] : non-tender [Normal Bowel Sounds] : normal bowel sounds [Normal Gait] : normal gait [No Edema] : no edema [No Cyanosis] : no cyanosis [No Clubbing] : no clubbing [No Varicosities] : no varicosities [No Rash] : no rash [Moves all extremities] : moves all extremities [No Focal Deficits] : no focal deficits [Normal Speech] : normal speech [Alert and Oriented] : alert and oriented [Normal memory] : normal memory

## 2024-09-19 ENCOUNTER — APPOINTMENT (OUTPATIENT)
Dept: CARDIOLOGY | Facility: CLINIC | Age: 69
End: 2024-09-19
Payer: MEDICARE

## 2024-09-19 VITALS
BODY MASS INDEX: 26.6 KG/M2 | HEIGHT: 71 IN | WEIGHT: 190 LBS | DIASTOLIC BLOOD PRESSURE: 90 MMHG | HEART RATE: 73 BPM | SYSTOLIC BLOOD PRESSURE: 130 MMHG

## 2024-09-19 DIAGNOSIS — I25.9 CHRONIC ISCHEMIC HEART DISEASE, UNSPECIFIED: ICD-10-CM

## 2024-09-19 DIAGNOSIS — I25.2 OLD MYOCARDIAL INFARCTION: ICD-10-CM

## 2024-09-19 DIAGNOSIS — Z00.00 ENCOUNTER FOR GENERAL ADULT MEDICAL EXAMINATION W/OUT ABNORMAL FINDINGS: ICD-10-CM

## 2024-09-19 DIAGNOSIS — E78.5 HYPERLIPIDEMIA, UNSPECIFIED: ICD-10-CM

## 2024-09-19 DIAGNOSIS — N18.30 CHRONIC KIDNEY DISEASE, STAGE 3 UNSPECIFIED: ICD-10-CM

## 2024-09-19 DIAGNOSIS — M26.649 ARTHRITIS OF UNSPECIFIED TEMPOROMANDIBULAR JOINT: ICD-10-CM

## 2024-09-19 PROCEDURE — 93000 ELECTROCARDIOGRAM COMPLETE: CPT

## 2024-09-19 PROCEDURE — 99214 OFFICE O/P EST MOD 30 MIN: CPT

## 2024-09-19 NOTE — CARDIOLOGY SUMMARY
[de-identified] : 09/19/24: SR, ? old IWMI, old AWMI. [de-identified] : 2D ECHO 05/04/22:\par  Apical WMAs, LVEF 56%, G1DD\par  Mild MR\par  Aortic root 4.0 cm.

## 2024-09-19 NOTE — HISTORY OF PRESENT ILLNESS
[FreeTextEntry1] : 68-yo male who had an AWMI in [facility] > 10 years ago. [facility] was not available there at the time, medical therapy was given. Patient was told that he had a transmural scar and very weak heart. He did feel very SOB with minimal exertion after discharge from the hospital but gradually was able to increase his daily walking. He has generally been stable for > 10 years, with only occasional episodes of chest tightness with emotional stress and unusual exertion, quickly relieved by SL nitroglycerin, improved since starting Ranexa. Exercise stress ECHO in 2014 showed apical scar, EF 45-50%, no ischemia. Hx of exposure of [place] radiation   Patient presents for a follow up visit. He reports feeling well. Has  still has occasional episodes of chest pressure in the mornings when he wakes up.  Has b/l leg cramps when he lies down.  Dx with lung nodules.  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

## 2024-09-19 NOTE — ASSESSMENT
[FreeTextEntry1] : 69 y/o M CAD, s/p AWMI (no intervention performed). Apical scar, LVEF 56%. Good functional capacity. Patient is not interested in [facility] unless his symptoms change. Hyperlipidemia uncontrolled. Patient is refusing to take Fenofibrate or Vascepa. CKD 3. HTN still controlled.  Plan: Continue Rosuvastatin 20 mg daily. Patient is not willing to increase the dose. Continue daily walking. Repeat fasting blood work. F/u in 6 months.  Jesús Chilel MD

## 2024-09-19 NOTE — HISTORY OF PRESENT ILLNESS
[FreeTextEntry1] : 68-yo male who had an AWMI in [facility] > 10 years ago. [facility] was not available there at the time, medical therapy was given. Patient was told that he had a transmural scar and very weak heart. He did feel very SOB with minimal exertion after discharge from the hospital but gradually was able to increase his daily walking. He has generally been stable for > 10 years, with only occasional episodes of chest tightness with emotional stress and unusual exertion, quickly relieved by SL nitroglycerin, improved since starting Ranexa. Exercise stress ECHO in 2014 showed apical scar, EF 45-50%, no ischemia. Hx of exposure of [place] radiation   Patient presents for a follow up visit. He reports feeling well. Has  still has occasional episodes of chest pressure in the mornings when he wakes up.  Has b/l leg cramps when he lies down.  Dx with lung nodules.  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

## 2024-09-19 NOTE — CARDIOLOGY SUMMARY
[de-identified] : 09/19/24: SR, ? old IWMI, old AWMI. [de-identified] : 2D ECHO 05/04/22:\par  Apical WMAs, LVEF 56%, G1DD\par  Mild MR\par  Aortic root 4.0 cm.

## 2024-09-20 ENCOUNTER — RX RENEWAL (OUTPATIENT)
Age: 69
End: 2024-09-20

## 2024-10-04 ENCOUNTER — APPOINTMENT (OUTPATIENT)
Dept: CARDIOLOGY | Facility: CLINIC | Age: 69
End: 2024-10-04
Payer: MEDICARE

## 2024-10-04 PROCEDURE — 93880 EXTRACRANIAL BILAT STUDY: CPT

## 2025-01-30 ENCOUNTER — NON-APPOINTMENT (OUTPATIENT)
Age: 70
End: 2025-01-30

## 2025-01-30 ENCOUNTER — APPOINTMENT (OUTPATIENT)
Dept: CARDIOLOGY | Facility: CLINIC | Age: 70
End: 2025-01-30
Payer: MEDICARE

## 2025-01-30 VITALS
SYSTOLIC BLOOD PRESSURE: 126 MMHG | DIASTOLIC BLOOD PRESSURE: 84 MMHG | WEIGHT: 189 LBS | HEIGHT: 71 IN | BODY MASS INDEX: 26.46 KG/M2 | HEART RATE: 79 BPM

## 2025-01-30 DIAGNOSIS — N18.30 CHRONIC KIDNEY DISEASE, STAGE 3 UNSPECIFIED: ICD-10-CM

## 2025-01-30 DIAGNOSIS — E78.5 HYPERLIPIDEMIA, UNSPECIFIED: ICD-10-CM

## 2025-01-30 DIAGNOSIS — I10 ESSENTIAL (PRIMARY) HYPERTENSION: ICD-10-CM

## 2025-01-30 DIAGNOSIS — I25.2 OLD MYOCARDIAL INFARCTION: ICD-10-CM

## 2025-01-30 DIAGNOSIS — I25.9 CHRONIC ISCHEMIC HEART DISEASE, UNSPECIFIED: ICD-10-CM

## 2025-01-30 DIAGNOSIS — R06.02 SHORTNESS OF BREATH: ICD-10-CM

## 2025-01-30 PROCEDURE — 93000 ELECTROCARDIOGRAM COMPLETE: CPT

## 2025-01-30 PROCEDURE — 99214 OFFICE O/P EST MOD 30 MIN: CPT

## 2025-07-31 ENCOUNTER — APPOINTMENT (OUTPATIENT)
Dept: CARDIOLOGY | Facility: CLINIC | Age: 70
End: 2025-07-31
Payer: MEDICARE

## 2025-07-31 VITALS
SYSTOLIC BLOOD PRESSURE: 110 MMHG | BODY MASS INDEX: 25.9 KG/M2 | HEIGHT: 71 IN | HEART RATE: 75 BPM | WEIGHT: 185 LBS | DIASTOLIC BLOOD PRESSURE: 70 MMHG

## 2025-07-31 DIAGNOSIS — I10 ESSENTIAL (PRIMARY) HYPERTENSION: ICD-10-CM

## 2025-07-31 DIAGNOSIS — Z00.00 ENCOUNTER FOR GENERAL ADULT MEDICAL EXAMINATION W/OUT ABNORMAL FINDINGS: ICD-10-CM

## 2025-07-31 DIAGNOSIS — I25.9 CHRONIC ISCHEMIC HEART DISEASE, UNSPECIFIED: ICD-10-CM

## 2025-07-31 DIAGNOSIS — E78.5 HYPERLIPIDEMIA, UNSPECIFIED: ICD-10-CM

## 2025-07-31 DIAGNOSIS — I25.2 OLD MYOCARDIAL INFARCTION: ICD-10-CM

## 2025-07-31 DIAGNOSIS — R06.02 SHORTNESS OF BREATH: ICD-10-CM

## 2025-07-31 DIAGNOSIS — N18.30 CHRONIC KIDNEY DISEASE, STAGE 3 UNSPECIFIED: ICD-10-CM

## 2025-07-31 PROCEDURE — 99214 OFFICE O/P EST MOD 30 MIN: CPT | Mod: 25

## 2025-07-31 PROCEDURE — 93000 ELECTROCARDIOGRAM COMPLETE: CPT

## 2025-07-31 RX ORDER — SEMAGLUTIDE 1.34 MG/ML
4 INJECTION, SOLUTION SUBCUTANEOUS
Refills: 0 | Status: ACTIVE | COMMUNITY